# Patient Record
Sex: MALE | Race: WHITE
[De-identification: names, ages, dates, MRNs, and addresses within clinical notes are randomized per-mention and may not be internally consistent; named-entity substitution may affect disease eponyms.]

---

## 2020-03-10 ENCOUNTER — HOSPITAL ENCOUNTER (OUTPATIENT)
Dept: HOSPITAL 7 - FB.SDS | Age: 70
Discharge: HOME | End: 2020-03-10
Attending: SURGERY
Payer: MEDICARE

## 2020-03-10 DIAGNOSIS — D12.2: Primary | ICD-10-CM

## 2020-03-10 DIAGNOSIS — K57.31: ICD-10-CM

## 2020-03-10 DIAGNOSIS — Z79.899: ICD-10-CM

## 2020-03-10 DIAGNOSIS — Z88.0: ICD-10-CM

## 2020-03-10 PROCEDURE — 45385 COLONOSCOPY W/LESION REMOVAL: CPT

## 2020-03-10 PROCEDURE — 88305 TISSUE EXAM BY PATHOLOGIST: CPT

## 2020-03-10 NOTE — OR
DATE OF OPERATION:  03/10/2020

 

SURGEON:  Alex Vasquez MD

 

PROCEDURE PERFORMED:  Colonoscopy with cold loop snare biopsy.

 

PREOPERATIVE DIAGNOSIS:  Need for colon cancer screening.

 

POSTOPERATIVE DIAGNOSES:  Ascending colon polyp and sigmoid diverticulosis.

 

INDICATIONS FOR PROCEDURE:  This is a 69-year-old white male, who was referred

for his screening colonoscopy.  He was offered and accepted same.

 

DESCRIPTION OF OPERATION:  After an excellent IV sedation was administered,

digital rectal exam was performed.  No marked abnormality was noted.  Flexible

colonoscope was inserted and advanced to the cecum.  Prep was excellent.

Following findings were noted.  Ascending colon, just distal to the cecum, small

polypoid lesion, biopsied and retrieved with cold loop snare.  Transverse colon,

unremarkable.  Descending colon, unremarkable.  Sigmoid, occasional diverticula.

Rectum and anus, unremarkable.  Results will be sent to the patient by letter.

He tolerated the procedure well and was taken back to his room in good

condition.

 

Job#: [id number]

DD: 03/10/2020 0810

DT: 03/10/2020 1401 AS/REVA

## 2020-03-10 NOTE — PCM.OPNOTE
- General Post-Op/Procedure Note


Date of Surgery/Procedure: 03/10/20


Operative Procedure(s): c scope with cold loop snare biopsy


Findings: 





ascending colon polyp 


sigmoid diverticulosis


Pre Op Diagnosis: hematochezia


Post-Op Diagnosis: diveritucolosis.  colon polyp


Anesthesia Technique: MAC


Primary Surgeon: Alex Vasquez


Anesthesia Provider: Paul Flores


Pathology: 





colon polyp 


Complications: None


Condition: Good


Free Text/Narrative:: 





see dictation